# Patient Record
Sex: MALE | Race: WHITE | NOT HISPANIC OR LATINO | Employment: OTHER | URBAN - METROPOLITAN AREA
[De-identification: names, ages, dates, MRNs, and addresses within clinical notes are randomized per-mention and may not be internally consistent; named-entity substitution may affect disease eponyms.]

---

## 2023-08-24 PROBLEM — L89.90 ULCER, PRESSURE: Status: ACTIVE | Noted: 2023-08-24

## 2023-08-24 PROBLEM — I69.320 APHASIA FOLLOWING CEREBRAL INFARCTION: Status: ACTIVE | Noted: 2023-08-24

## 2023-08-24 PROBLEM — E55.9 VITAMIN D DEFICIENCY: Status: ACTIVE | Noted: 2023-08-24

## 2023-08-24 PROBLEM — R47.01 EXPRESSIVE APHASIA: Status: ACTIVE | Noted: 2023-08-24

## 2023-08-24 PROBLEM — Z96.642 HISTORY OF LEFT HIP REPLACEMENT: Status: ACTIVE | Noted: 2023-08-24

## 2023-08-24 PROBLEM — G40.909 NONINTRACTABLE EPILEPSY WITHOUT STATUS EPILEPTICUS (MULTI): Status: ACTIVE | Noted: 2023-08-24

## 2023-08-24 PROBLEM — I48.0 PAROXYSMAL ATRIAL FIBRILLATION (MULTI): Status: ACTIVE | Noted: 2023-08-24

## 2023-08-24 PROBLEM — I13.10 HYPERTENSIVE HEART AND CHRONIC KIDNEY DISEASE WITHOUT HEART FAILURE, WITH STAGE 1 THROUGH STAGE 4 CHRONIC KIDNEY DISEASE, OR UNSPECIFIED CHRONIC KIDNEY DISEASE: Status: ACTIVE | Noted: 2023-08-24

## 2023-08-24 PROBLEM — N18.32 STAGE 3B CHRONIC KIDNEY DISEASE (MULTI): Status: ACTIVE | Noted: 2023-08-24

## 2023-08-24 PROBLEM — I65.23 BILATERAL CAROTID ARTERY STENOSIS: Status: ACTIVE | Noted: 2023-08-24

## 2023-08-24 PROBLEM — G81.01: Status: ACTIVE | Noted: 2023-08-24

## 2023-08-24 PROBLEM — Z86.2 HISTORY OF ANEMIA: Status: ACTIVE | Noted: 2023-08-24

## 2023-08-24 PROBLEM — E78.00 PURE HYPERCHOLESTEROLEMIA: Status: ACTIVE | Noted: 2023-08-24

## 2023-08-24 PROBLEM — R13.10 DYSPHAGIA: Status: ACTIVE | Noted: 2023-08-24

## 2023-08-24 PROBLEM — I63.9: Status: ACTIVE | Noted: 2023-08-24

## 2023-08-24 PROBLEM — M25.559 PAIN IN JOINT INVOLVING PELVIC REGION AND THIGH: Status: ACTIVE | Noted: 2023-08-24

## 2023-08-24 PROBLEM — R56.9 SEIZURE (MULTI): Status: ACTIVE | Noted: 2023-08-24

## 2023-08-24 PROBLEM — E78.1 HYPERTRIGLYCERIDEMIA: Status: ACTIVE | Noted: 2023-08-24

## 2023-08-24 PROBLEM — E03.9 HYPOTHYROIDISM: Status: ACTIVE | Noted: 2023-08-24

## 2023-08-24 PROBLEM — R26.9 GAIT ABNORMALITY: Status: ACTIVE | Noted: 2023-08-24

## 2023-08-24 PROBLEM — I10 ESSENTIAL HYPERTENSION: Status: ACTIVE | Noted: 2023-08-24

## 2023-08-24 PROBLEM — M25.559 HIP PAIN: Status: ACTIVE | Noted: 2023-08-24

## 2023-08-24 PROBLEM — N18.2 CHRONIC KIDNEY DISEASE, STAGE 2 (MILD): Status: ACTIVE | Noted: 2023-08-24

## 2023-08-24 PROBLEM — S72.92XA CLOSED FRACTURE OF LEFT FEMUR (MULTI): Status: ACTIVE | Noted: 2023-08-24

## 2023-08-24 PROBLEM — C44.90 SKIN CANCER: Status: ACTIVE | Noted: 2023-08-24

## 2023-08-24 PROBLEM — I69.320 COMBINED RECEPTIVE AND EXPRESSIVE APHASIA DUE TO OLD STROKE: Status: ACTIVE | Noted: 2023-08-24

## 2023-08-24 PROBLEM — I69.359: Status: ACTIVE | Noted: 2019-01-01

## 2023-08-24 RX ORDER — NYSTATIN 100000 [USP'U]/G
POWDER TOPICAL
COMMUNITY
Start: 2023-03-13

## 2023-08-24 RX ORDER — AMLODIPINE BESYLATE 5 MG/1
TABLET ORAL
COMMUNITY
End: 2023-10-18 | Stop reason: WASHOUT

## 2023-08-24 RX ORDER — LEVETIRACETAM 500 MG/1
TABLET ORAL
COMMUNITY

## 2023-08-24 RX ORDER — POLYETHYLENE GLYCOL 3350 17 G/17G
POWDER, FOR SOLUTION ORAL
COMMUNITY

## 2023-08-24 RX ORDER — ASPIRIN 325 MG
TABLET ORAL
COMMUNITY

## 2023-08-24 RX ORDER — AMLODIPINE BESYLATE 2.5 MG/1
TABLET ORAL
COMMUNITY
End: 2023-10-18 | Stop reason: WASHOUT

## 2023-08-24 RX ORDER — MULTIVIT-MIN/FOLIC ACID/LUTEIN 400-250MCG
TABLET,CHEWABLE ORAL
COMMUNITY
End: 2023-10-18 | Stop reason: WASHOUT

## 2023-08-24 RX ORDER — SENNOSIDES 8.6 MG/1
TABLET ORAL
COMMUNITY
End: 2023-10-18 | Stop reason: WASHOUT

## 2023-08-24 RX ORDER — ATORVASTATIN CALCIUM 40 MG/1
TABLET, FILM COATED ORAL
COMMUNITY

## 2023-08-24 RX ORDER — DOCUSATE SODIUM 100 MG/1
CAPSULE, LIQUID FILLED ORAL
COMMUNITY

## 2023-08-24 RX ORDER — METOPROLOL SUCCINATE 50 MG/1
TABLET, EXTENDED RELEASE ORAL
COMMUNITY

## 2023-09-20 ENCOUNTER — HOSPITAL ENCOUNTER (OUTPATIENT)
Dept: DATA CONVERSION | Facility: HOSPITAL | Age: 78
Discharge: HOME | End: 2023-09-20
Payer: MEDICARE

## 2023-09-20 DIAGNOSIS — I63.512 CEREBRAL INFARCTION DUE TO UNSPECIFIED OCCLUSION OR STENOSIS OF LEFT MIDDLE CEREBRAL ARTERY (MULTI): ICD-10-CM

## 2023-09-20 DIAGNOSIS — R53.83 OTHER FATIGUE: ICD-10-CM

## 2023-09-20 DIAGNOSIS — R20.2 PARESTHESIA OF SKIN: ICD-10-CM

## 2023-09-20 DIAGNOSIS — M21.371 FOOT DROP, RIGHT FOOT: ICD-10-CM

## 2023-09-20 LAB
ALBUMIN SERPL-MCNC: 4.3 GM/DL (ref 3.5–5)
ALBUMIN/GLOB SERPL: 1.4 RATIO (ref 1.5–3)
ALP BLD-CCNC: 117 U/L (ref 35–125)
ALT SERPL-CCNC: 23 U/L (ref 5–40)
ANION GAP SERPL CALCULATED.3IONS-SCNC: 12 MMOL/L (ref 0–19)
AST SERPL-CCNC: 23 U/L (ref 5–40)
BASOPHILS # BLD AUTO: 0.11 K/UL (ref 0–0.22)
BASOPHILS NFR BLD AUTO: 1.5 % (ref 0–1)
BILIRUB SERPL-MCNC: 0.6 MG/DL (ref 0.1–1.2)
BUN SERPL-MCNC: 25 MG/DL (ref 8–25)
BUN/CREAT SERPL: 12.5 RATIO (ref 8–21)
CALCIUM SERPL-MCNC: 9.6 MG/DL (ref 8.5–10.4)
CHLORIDE SERPL-SCNC: 104 MMOL/L (ref 97–107)
CO2 SERPL-SCNC: 25 MMOL/L (ref 24–31)
CREAT SERPL-MCNC: 2 MG/DL (ref 0.4–1.6)
CRP SERPL-MCNC: 0.4 MG/DL (ref 0–2)
DEPRECATED RDW RBC AUTO: 45.1 FL (ref 37–54)
DIFFERENTIAL METHOD BLD: ABNORMAL
EOSINOPHIL # BLD AUTO: 0.46 K/UL (ref 0–0.45)
EOSINOPHIL NFR BLD: 6.3 % (ref 0–3)
ERYTHROCYTE [DISTWIDTH] IN BLOOD BY AUTOMATED COUNT: 13.2 % (ref 11.7–15)
ERYTHROCYTE [SEDIMENTATION RATE] IN BLOOD BY WESTERGREN METHOD: 16 MM/HR (ref 0–20)
GFR SERPL CREATININE-BSD FRML MDRD: 33 ML/MIN/1.73 M2
GLOBULIN SER-MCNC: 3.1 G/DL (ref 1.9–3.7)
GLUCOSE SERPL-MCNC: 104 MG/DL (ref 65–99)
HCT VFR BLD AUTO: 42.1 % (ref 41–50)
HGB BLD-MCNC: 13.9 GM/DL (ref 13.5–16.5)
IMM GRANULOCYTES # BLD AUTO: 0.03 K/UL (ref 0–0.1)
LEVETIRACETAM SERPL-MCNC: NORMAL UG/ML
LYMPHOCYTES # BLD AUTO: 1.73 K/UL (ref 1.2–3.2)
LYMPHOCYTES NFR BLD MANUAL: 23.8 % (ref 20–40)
MCH RBC QN AUTO: 30.5 PG (ref 26–34)
MCHC RBC AUTO-ENTMCNC: 33 % (ref 31–37)
MCV RBC AUTO: 92.5 FL (ref 80–100)
MONOCYTES # BLD AUTO: 0.57 K/UL (ref 0–0.8)
MONOCYTES NFR BLD MANUAL: 7.9 % (ref 0–8)
NEUTROPHILS # BLD AUTO: 4.36 K/UL
NEUTROPHILS # BLD AUTO: 4.36 K/UL (ref 1.8–7.7)
NEUTROPHILS.IMMATURE NFR BLD: 0.4 % (ref 0–1)
NEUTS SEG NFR BLD: 60.1 % (ref 50–70)
NRBC BLD-RTO: 0 /100 WBC
PLATELET # BLD AUTO: 273 K/UL (ref 150–450)
PMV BLD AUTO: 10.6 CU (ref 7–12.6)
POTASSIUM SERPL-SCNC: 5.1 MMOL/L (ref 3.4–5.1)
PROT SERPL-MCNC: 7.4 G/DL (ref 5.9–7.9)
RBC # BLD AUTO: 4.55 M/UL (ref 4.5–5.5)
SODIUM SERPL-SCNC: 141 MMOL/L (ref 133–145)
T4 FREE SERPL-MCNC: 1.2 NG/DL (ref 0.9–1.7)
TSH SERPL DL<=0.05 MIU/L-ACNC: 6.02 MIU/L (ref 0.27–4.2)
VIT B12 SERPL-MCNC: 739 PG/ML (ref 211–946)
WBC # BLD AUTO: 7.3 K/UL (ref 4.5–11)

## 2023-10-03 ENCOUNTER — TELEPHONE (OUTPATIENT)
Dept: PRIMARY CARE | Facility: CLINIC | Age: 78
End: 2023-10-03
Payer: MEDICARE

## 2023-10-13 ENCOUNTER — LAB (OUTPATIENT)
Dept: LAB | Facility: LAB | Age: 78
End: 2023-10-13
Payer: MEDICARE

## 2023-10-13 DIAGNOSIS — R73.09 OTHER ABNORMAL GLUCOSE: ICD-10-CM

## 2023-10-13 DIAGNOSIS — I10 ESSENTIAL (PRIMARY) HYPERTENSION: Primary | ICD-10-CM

## 2023-10-13 LAB
ALBUMIN SERPL-MCNC: 4.4 G/DL (ref 3.5–5)
ANION GAP SERPL CALC-SCNC: 16 MMOL/L
BUN SERPL-MCNC: 24 MG/DL (ref 8–25)
CALCIUM SERPL-MCNC: 9.5 MG/DL (ref 8.5–10.4)
CHLORIDE SERPL-SCNC: 102 MMOL/L (ref 97–107)
CO2 SERPL-SCNC: 21 MMOL/L (ref 24–31)
CREAT SERPL-MCNC: 2.2 MG/DL (ref 0.4–1.6)
EST. AVERAGE GLUCOSE BLD GHB EST-MCNC: 143 MG/DL
GFR SERPL CREATININE-BSD FRML MDRD: 30 ML/MIN/1.73M*2
GLUCOSE SERPL-MCNC: 94 MG/DL (ref 65–99)
HBA1C MFR BLD: 6.6 %
PHOSPHATE SERPL-MCNC: 3.2 MG/DL (ref 2.5–4.5)
POTASSIUM SERPL-SCNC: 4.8 MMOL/L (ref 3.4–5.1)
SODIUM SERPL-SCNC: 139 MMOL/L (ref 133–145)

## 2023-10-13 PROCEDURE — 83036 HEMOGLOBIN GLYCOSYLATED A1C: CPT

## 2023-10-13 PROCEDURE — 80069 RENAL FUNCTION PANEL: CPT

## 2023-10-13 PROCEDURE — 36415 COLL VENOUS BLD VENIPUNCTURE: CPT

## 2023-10-17 PROBLEM — I63.9: Status: ACTIVE | Noted: 2019-01-16

## 2023-10-17 RX ORDER — EPINEPHRINE 0.3 MG/.3ML
0.3 INJECTION SUBCUTANEOUS
COMMUNITY
Start: 2017-09-26

## 2023-10-17 RX ORDER — SYRINGE,SAFETY WITH NEEDLE,1ML 25GX1"
SYRINGE (EA) MISCELLANEOUS
COMMUNITY
Start: 2019-03-18 | End: 2023-10-18 | Stop reason: WASHOUT

## 2023-10-17 RX ORDER — POLYVINYL ALCOHOL 14 MG/ML
1 SOLUTION/ DROPS OPHTHALMIC
COMMUNITY
Start: 2019-02-05 | End: 2023-10-18 | Stop reason: WASHOUT

## 2023-10-17 RX ORDER — LOSARTAN POTASSIUM AND HYDROCHLOROTHIAZIDE 12.5; 5 MG/1; MG/1
1 TABLET ORAL
COMMUNITY
Start: 2018-09-27 | End: 2023-10-18 | Stop reason: WASHOUT

## 2023-10-17 RX ORDER — MULTIVITAMIN
1 TABLET ORAL
COMMUNITY
Start: 2015-01-23

## 2023-10-17 RX ORDER — MUPIROCIN 20 MG/G
OINTMENT TOPICAL 2 TIMES DAILY
COMMUNITY
Start: 2023-01-04 | End: 2023-10-18 | Stop reason: WASHOUT

## 2023-10-18 ENCOUNTER — OFFICE VISIT (OUTPATIENT)
Dept: PRIMARY CARE | Facility: CLINIC | Age: 78
End: 2023-10-18
Payer: MEDICARE

## 2023-10-18 VITALS
TEMPERATURE: 97.6 F | BODY MASS INDEX: 28.49 KG/M2 | HEIGHT: 68 IN | SYSTOLIC BLOOD PRESSURE: 139 MMHG | OXYGEN SATURATION: 97 % | WEIGHT: 188 LBS | HEART RATE: 74 BPM | DIASTOLIC BLOOD PRESSURE: 76 MMHG

## 2023-10-18 DIAGNOSIS — Z00.00 ENCOUNTER FOR MEDICARE ANNUAL WELLNESS EXAM: Primary | ICD-10-CM

## 2023-10-18 DIAGNOSIS — N18.4 CHRONIC RENAL DISEASE, STAGE IV (MULTI): ICD-10-CM

## 2023-10-18 DIAGNOSIS — I48.0 PAROXYSMAL ATRIAL FIBRILLATION (MULTI): ICD-10-CM

## 2023-10-18 DIAGNOSIS — I69.359 HEMIPLEGIA AFFECTING DOMINANT SIDE, POST-STROKE (MULTI): ICD-10-CM

## 2023-10-18 DIAGNOSIS — L50.9 URTICARIA: ICD-10-CM

## 2023-10-18 DIAGNOSIS — E11.9 DIABETES MELLITUS WITHOUT COMPLICATION (MULTI): ICD-10-CM

## 2023-10-18 PROCEDURE — 1159F MED LIST DOCD IN RCRD: CPT | Performed by: INTERNAL MEDICINE

## 2023-10-18 PROCEDURE — G0439 PPPS, SUBSEQ VISIT: HCPCS | Performed by: INTERNAL MEDICINE

## 2023-10-18 PROCEDURE — 1126F AMNT PAIN NOTED NONE PRSNT: CPT | Performed by: INTERNAL MEDICINE

## 2023-10-18 PROCEDURE — 1036F TOBACCO NON-USER: CPT | Performed by: INTERNAL MEDICINE

## 2023-10-18 PROCEDURE — 3078F DIAST BP <80 MM HG: CPT | Performed by: INTERNAL MEDICINE

## 2023-10-18 PROCEDURE — 3075F SYST BP GE 130 - 139MM HG: CPT | Performed by: INTERNAL MEDICINE

## 2023-10-18 RX ORDER — METHYLPREDNISOLONE 4 MG/1
TABLET ORAL
Qty: 21 TABLET | Refills: 0 | Status: SHIPPED | OUTPATIENT
Start: 2023-10-18 | End: 2023-10-25

## 2023-10-18 ASSESSMENT — PATIENT HEALTH QUESTIONNAIRE - PHQ9
1. LITTLE INTEREST OR PLEASURE IN DOING THINGS: NOT AT ALL
SUM OF ALL RESPONSES TO PHQ9 QUESTIONS 1 AND 2: 0
2. FEELING DOWN, DEPRESSED OR HOPELESS: NOT AT ALL

## 2023-10-18 ASSESSMENT — ENCOUNTER SYMPTOMS
LOSS OF SENSATION IN FEET: 1
OCCASIONAL FEELINGS OF UNSTEADINESS: 1
DEPRESSION: 0

## 2023-10-18 ASSESSMENT — PAIN SCALES - GENERAL: PAINLEVEL: 0-NO PAIN

## 2023-10-18 NOTE — PROGRESS NOTES
[facility]: [facility]  MEDICARE WELLNESS EXAM      Cooper Terry is a 78 y.o. male that is presenting today for Annual Exam (MEDICARE PHYSICAL).    Assessment/Plan    Diagnoses and all orders for this visit:  Encounter for Medicare annual wellness exam       Comments:            Reviewed Labs             COVID: UTD , Tdap: Declined, Flu: OD ( wants to get in his Pharmacy)    Shingrex: UTD, PNV UTD             Colonoscopy OD Refusing due to his hemiplegia              PSA UTD             PAP N/A             DEXA scan  N/A             Mammogram N/A  Chronic renal disease, stage IV (CMS/HCC)    Per most recent BW well uncontrolled got worse, has not been following with Nephrology after the initial visit.    Discussed hydration and avoiding nephrotoxic meds        Stressed out the importance of following up with Nephro      - Renal function panel; Future  Diabetes mellitus without complication (CMS/HCC)     Diet controlled, per recent BW        Continue same for now  -     Hemoglobin A1c; Future  Hemiplegia affecting dominant side, post-stroke (CMS/HCC)       Good improvement with PT   Paroxysmal atrial fibrillation (CMS/HCC)     Currently in SR      Continue long term anticoagulation   -     apixaban (Eliquis) 2.5 mg tablet; Take 1 tablet (2.5 mg) by mouth 2 times a day.  Urticaria       - Localized in Rt.arm and spreading distally to the Rt.hand  -     methylPREDNISolone (Medrol Dospak) 4 mg tablets; Take as directed on package.  ADVANCED CARE PLANNING  Advanced Care Planning was discussed with patient:  The patient has an active advanced care plan on file. The patient has an active surrogate decision-maker on file.  The patient was encouraged to ensure that any/all documentation is accurate and up to date, and that our office be provided a copy in the event that anything changes.    ACTIVITIES OF DAILY LIVING  Basic ADLs:  Bathing: Requires Assistance, Dressing: Requires Assistance,  Toileting: Requires Assistance, Transferring: Requires Assistance, Continence: Independent, Feeding: Independent.    Instrumental ADLs:  Ability to use phone: Requires Assistance, Shopping: Completely Dependent, Cooking: Completely Dependent, House-keeping: Completely Dependent, Laundry: Requires Assistance, Transportation: Completely Dependent, Medication Management: Completely Dependent, Finance Management: Completely Dependent.    Subjective   Patient is here with his wife for his wellness and been doing fairly well till 4 days ago where he developed red rash over his left arm not itchy or painful - denies       Review of Systems  All pertinent POSITIVES as noted per HPI.  All other systems have been reviewed and are NEGATIVE and /or Noncontributory to this patient current visit or complaint.    Objective   Vitals:    10/18/23 1506   BP: 139/76   Pulse: 74   Temp: 36.4 °C (97.6 °F)   SpO2: 97%      Body mass index is 28.59 kg/m².  Physical Exam  Vitals and nursing note reviewed.   Constitutional:       Appearance: Normal appearance.   HENT:      Head: Normocephalic and atraumatic.      Right Ear: Tympanic membrane, ear canal and external ear normal.      Left Ear: Tympanic membrane, ear canal and external ear normal.      Nose: Nose normal.      Mouth/Throat:      Mouth: Mucous membranes are moist.      Pharynx: Oropharynx is clear.   Eyes:      Extraocular Movements: Extraocular movements intact.      Conjunctiva/sclera: Conjunctivae normal.      Pupils: Pupils are equal, round, and reactive to light.   Neck:      Vascular: No carotid bruit.   Cardiovascular:      Rate and Rhythm: Normal rate and regular rhythm.      Pulses: Normal pulses.      Heart sounds: Normal heart sounds.   Pulmonary:      Effort: Pulmonary effort is normal.      Breath sounds: Normal breath sounds.   Abdominal:      General: Abdomen is flat. Bowel sounds are normal.      Palpations: Abdomen is soft.   Musculoskeletal:         General: No  "swelling. Normal range of motion.      Cervical back: Normal range of motion and neck supple.   Lymphadenopathy:      Cervical: No cervical adenopathy.   Skin:     General: Skin is warm and dry.   Neurological:      General: No focal deficit present.      Mental Status: He is alert and oriented to person, place, and time. Mental status is at baseline.   Psychiatric:         Mood and Affect: Mood normal.         Behavior: Behavior normal.       Diagnostic Results   Lab Results   Component Value Date    GLUCOSE 94 10/13/2023    CALCIUM 9.5 10/13/2023     10/13/2023    K 4.8 10/13/2023    CO2 21 (L) 10/13/2023     10/13/2023    BUN 24 10/13/2023    CREATININE 2.20 (H) 10/13/2023     Lab Results   Component Value Date    ALT 23 09/20/2023    AST 23 09/20/2023    ALKPHOS 117 09/20/2023    BILITOT 0.6 09/20/2023     Lab Results   Component Value Date    WBC 7.3 09/20/2023    HGB 13.9 09/20/2023    HCT 42.1 09/20/2023    MCV 92.5 09/20/2023     09/20/2023     Lab Results   Component Value Date    CHOL 125 (L) 07/13/2023    CHOL 118 (L) 05/20/2022    CHOL 118 (L) 05/05/2021     Lab Results   Component Value Date    HDL 29 (L) 07/13/2023    HDL 29 (L) 05/20/2022    HDL 28 (L) 05/05/2021     Lab Results   Component Value Date    LDLCALC 50 (L) 07/13/2023    LDLCALC 56 (L) 05/20/2022    LDLCALC 37 (L) 05/05/2021     Lab Results   Component Value Date    TRIG 229 (H) 07/13/2023    TRIG 164 (H) 05/20/2022    TRIG 143 09/09/2021     No components found for: \"CHOLHDL\"  Lab Results   Component Value Date    HGBA1C 6.6 (H) 10/13/2023     Other labs not included in the list above reviewed either before or during this encounter.    History   History reviewed. No pertinent past medical history.  Past Surgical History:   Procedure Laterality Date    MR HEAD ANGIO WO IV CONTRAST  1/11/2019    MR HEAD ANGIO WO IV CONTRAST [facility]     Family History   Problem Relation Name Age of Onset    Glaucoma Mother   "    Aneurysm Mother      Parkinsonism Father      Diabetes Sister       Social History     Socioeconomic History    Marital status:      Spouse name: Not on file    Number of children: Not on file    Years of education: Not on file    Highest education level: Not on file   Occupational History    Not on file   Tobacco Use    Smoking status: Never    Smokeless tobacco: Never   Substance and Sexual Activity    Alcohol use: Never    Drug use: Never    Sexual activity: Not on file   Other Topics Concern    Not on file   Social History Narrative    Not on file     Social Determinants of Health     Financial Resource Strain: Not on file   Food Insecurity: Not on file   Transportation Needs: Not on file   Physical Activity: Not on file   Stress: Not on file   Social Connections: Not on file   Intimate Partner Violence: Not on file   Housing Stability: Not on file     Allergies   Allergen Reactions    Venom-Wasp Swelling    Wasp Venom Swelling     Current Outpatient Medications on File Prior to Visit   Medication Sig Dispense Refill    amlodipine besylate (AMLODIPINE ORAL) Take 10 mg by mouth once daily in the morning.      aspirin 325 mg tablet 1 tablet Orally Once a day      atorvastatin (Lipitor) 40 mg tablet 1 tablet Orally Once a day for 90 days      docusate sodium (Colace) 100 mg capsule 1 capsule as needed Orally Once a day      EPINEPHrine 0.3 mg/0.3 mL injection syringe Inject 0.3 mL (0.3 mg) into the muscle.      levETIRAcetam (Keppra) 500 mg tablet 1 tablet Oral Twice a day      metoprolol succinate XL (Toprol-XL) 50 mg 24 hr tablet 1 tablet Orally Once a day for 90 days      multivitamin tablet Take 1 tablet by mouth once daily.      nystatin (Mycostatin) 100,000 unit/gram powder 1 application TO AFFECTED AREA Twice a day for 30 days      polyethylene glycol (Miralax) 17 gram/dose powder as directed Orally      [DISCONTINUED] apixaban (Eliquis) 5 mg tablet 1 tab(s) Orally twice a day      [DISCONTINUED]  "insulin syringe 29G X 1/2\" 1 mL syringe       [DISCONTINUED] losartan-hydrochlorothiazide (Hyzaar) 50-12.5 mg tablet Take 1 tablet by mouth once daily.      [DISCONTINUED] mupirocin (Bactroban) 2 % ointment Apply topically 2 times a day. to affected area      [DISCONTINUED] polyvinyl alcohol (Liquifilm Tears) 1.4 % ophthalmic solution Administer 1 drop into affected eye(s) once daily.      [DISCONTINUED] amLODIPine (Norvasc) 2.5 mg tablet 1 tablet Orally Once a day for 90 days      [DISCONTINUED] amLODIPine (Norvasc) 5 mg tablet 1 tablet daily Orally Once a day for 90 days      [DISCONTINUED] docusate sodium (Colace) 50 mg capsule 1 capsule oral daily every evening      [DISCONTINUED] mv-min-folic acid-lutein (Centrum Silver) 400-250 mcg tablet,chewable 1 tablet oral daily every morning      [DISCONTINUED] sennosides (Senokot) 8.6 mg tablet 1 tablet oral daily at bedtime       No current facility-administered medications on file prior to visit.     Immunization History   Administered Date(s) Administered    Flu vaccine, quadrivalent, high-dose, preservative free, age 65y+ (FLUZONE) 11/05/2020, 11/02/2021, 10/20/2022    Influenza, High Dose Seasonal, Preservative Free 10/09/2019, 10/15/2019    Influenza, seasonal, injectable 10/18/2015    Influenza, seasonal, injectable, preservative free 10/04/2013    Influenza, trivalent, adjuvanted 10/15/2018    Moderna COVID-19 vaccine, bivalent, blue cap/gray label *Check age/dose* 10/20/2022    Moderna SARS-CoV-2 Vaccination 03/12/2021, 04/09/2021, 06/01/2022    Pneumococcal conjugate vaccine, 13-valent (PREVNAR 13) 01/23/2015, 05/10/2016    Pneumococcal polysaccharide vaccine, 23-valent, age 2 years and older (PNEUMOVAX 23) 05/11/2018    Tdap vaccine, age 7 year and older (BOOSTRIX) 01/23/2015     Patient's medical history was reviewed and updated either before or during this encounter.    Ilana Ryan MD  "

## 2023-12-11 ENCOUNTER — TELEPHONE (OUTPATIENT)
Dept: PRIMARY CARE | Facility: CLINIC | Age: 78
End: 2023-12-11
Payer: MEDICARE

## 2023-12-11 NOTE — TELEPHONE ENCOUNTER
Pt c/o worsening cough, sore throat, chest hurts from cough x3 weeks.  Has only been taking ImmunePlus and has not tested for COVID.  Requesting further direction or Rx to [facility] [address] [place], [place] [id number] gary    Pt advised to take COVID test and call office back.

## 2023-12-18 ENCOUNTER — TELEPHONE (OUTPATIENT)
Dept: PRIMARY CARE | Facility: CLINIC | Age: 78
End: 2023-12-18
Payer: MEDICARE

## 2023-12-18 NOTE — TELEPHONE ENCOUNTER
Spouse states pt has not been feeling well.  Has sore throat, barky cough with no improvement in 2 weeks.  COVID test negative.  Asking if pt needs medication or appt.  Please advise.  Ph: [phone]    Marva [place] in [place] if Rx.

## 2023-12-20 ENCOUNTER — OFFICE VISIT (OUTPATIENT)
Dept: PRIMARY CARE | Facility: CLINIC | Age: 78
End: 2023-12-20
Payer: MEDICARE

## 2023-12-20 VITALS
HEART RATE: 86 BPM | SYSTOLIC BLOOD PRESSURE: 131 MMHG | TEMPERATURE: 97.8 F | DIASTOLIC BLOOD PRESSURE: 70 MMHG | OXYGEN SATURATION: 93 %

## 2023-12-20 DIAGNOSIS — J06.9 URI WITH COUGH AND CONGESTION: Primary | ICD-10-CM

## 2023-12-20 PROCEDURE — 99213 OFFICE O/P EST LOW 20 MIN: CPT | Performed by: INTERNAL MEDICINE

## 2023-12-20 PROCEDURE — 1159F MED LIST DOCD IN RCRD: CPT | Performed by: INTERNAL MEDICINE

## 2023-12-20 PROCEDURE — 3078F DIAST BP <80 MM HG: CPT | Performed by: INTERNAL MEDICINE

## 2023-12-20 PROCEDURE — 3075F SYST BP GE 130 - 139MM HG: CPT | Performed by: INTERNAL MEDICINE

## 2023-12-20 PROCEDURE — 1036F TOBACCO NON-USER: CPT | Performed by: INTERNAL MEDICINE

## 2023-12-20 PROCEDURE — 1125F AMNT PAIN NOTED PAIN PRSNT: CPT | Performed by: INTERNAL MEDICINE

## 2023-12-20 RX ORDER — BENZONATATE 200 MG/1
200 CAPSULE ORAL 3 TIMES DAILY PRN
Qty: 30 CAPSULE | Refills: 0 | Status: SHIPPED | OUTPATIENT
Start: 2023-12-20 | End: 2023-12-30

## 2023-12-20 ASSESSMENT — ENCOUNTER SYMPTOMS
FEVER: 0
DEPRESSION: 0
LOSS OF SENSATION IN FEET: 0
HEADACHES: 0
SHORTNESS OF BREATH: 0
WHEEZING: 0
CHILLS: 0
SORE THROAT: 1
COUGH: 1
RHINORRHEA: 0
OCCASIONAL FEELINGS OF UNSTEADINESS: 1
SWEATS: 0

## 2023-12-20 ASSESSMENT — PATIENT HEALTH QUESTIONNAIRE - PHQ9
SUM OF ALL RESPONSES TO PHQ9 QUESTIONS 1 AND 2: 0
2. FEELING DOWN, DEPRESSED OR HOPELESS: NOT AT ALL
1. LITTLE INTEREST OR PLEASURE IN DOING THINGS: NOT AT ALL

## 2023-12-20 ASSESSMENT — COPD QUESTIONNAIRES: COPD: 0

## 2023-12-20 ASSESSMENT — PAIN SCALES - GENERAL: PAINLEVEL: 3

## 2023-12-20 NOTE — PROGRESS NOTES
[facility]: [facility]  PROGRESS NOTE      Cooper Terry is a 78 y.o. male that is presenting today for Sore Throat (And barky cough, besonate of wifes being taken, ).    Assessment/Plan   Diagnoses and all orders for this visit:  URI with cough and congestion  -     benzonatate (Tessalon) 200 mg capsule; Take 1 capsule (200 mg) by mouth 3 times a day as needed for cough for up to 10 days. Do not crush or chew.  Subjective   Sore Throat   This is a new problem. The current episode started 1 to 4 weeks ago. The problem has been gradually worsening. Neither side of throat is experiencing more pain than the other. There has been no fever. The pain is at a severity of 5/10. Associated symptoms include coughing. Pertinent negatives include no headaches or shortness of breath. He has had no exposure to strep. He has tried nothing for the symptoms.   Cough  This is a new problem. The current episode started 1 to 4 weeks ago. The problem has been unchanged. The problem occurs every few minutes. The cough is Non-productive. Associated symptoms include a sore throat. Pertinent negatives include no chest pain, chills, fever, headaches, nasal congestion, rhinorrhea, shortness of breath, sweats or wheezing. Nothing aggravates the symptoms. Risk factors: None. He has tried OTC cough suppressant for the symptoms. The treatment provided no relief. There is no history of asthma, bronchiectasis, bronchitis, COPD, emphysema, environmental allergies or pneumonia. None     Review of Systems   Constitutional:  Negative for chills and fever.   HENT:  Positive for sore throat. Negative for rhinorrhea.    Respiratory:  Positive for cough. Negative for shortness of breath and wheezing.    Cardiovascular:  Negative for chest pain.   Allergic/Immunologic: Negative for environmental allergies.   Neurological:  Negative for headaches.      Objective   Vitals:    12/20/23 1614   BP: 131/70   Pulse: 86   Temp: 36.6  °C (97.8 °F)   SpO2: 93%      There is no height or weight on file to calculate BMI.  Physical Exam  Vitals (Patient-reported vitals.) and nursing note reviewed.   Constitutional:       Appearance: Normal appearance.      Comments: This is a virtual / telehealth encounter; unable to perform physical exam.   HENT:      Head: Normocephalic and atraumatic.      Right Ear: Tympanic membrane, ear canal and external ear normal.      Left Ear: Ear canal and external ear normal.      Nose: Congestion present. No rhinorrhea.      Mouth/Throat:      Mouth: Mucous membranes are moist.      Pharynx: Oropharynx is clear. No oropharyngeal exudate or posterior oropharyngeal erythema.   Eyes:      Extraocular Movements: Extraocular movements intact.      Conjunctiva/sclera: Conjunctivae normal.   Neck:      Vascular: No carotid bruit.   Cardiovascular:      Rate and Rhythm: Normal rate and regular rhythm.      Pulses: Normal pulses.      Heart sounds: Normal heart sounds.   Pulmonary:      Effort: Pulmonary effort is normal.      Breath sounds: Normal breath sounds. No wheezing or rhonchi.   Abdominal:      General: Abdomen is flat.      Palpations: Abdomen is soft.   Musculoskeletal:         General: No swelling. Normal range of motion.      Cervical back: Neck supple.   Lymphadenopathy:      Cervical: No cervical adenopathy.   Skin:     General: Skin is warm and dry.   Neurological:      Mental Status: He is alert.   Psychiatric:         Mood and Affect: Mood normal.       Diagnostic Results   Lab Results   Component Value Date    GLUCOSE 94 10/13/2023    CALCIUM 9.5 10/13/2023     10/13/2023    K 4.8 10/13/2023    CO2 21 (L) 10/13/2023     10/13/2023    BUN 24 10/13/2023    CREATININE 2.20 (H) 10/13/2023     Lab Results   Component Value Date    ALT 23 09/20/2023    AST 23 09/20/2023    ALKPHOS 117 09/20/2023    BILITOT 0.6 09/20/2023     Lab Results   Component Value Date    WBC 7.3 09/20/2023    HGB 13.9 09/20/2023  "   HCT 42.1 09/20/2023    MCV 92.5 09/20/2023     09/20/2023     Lab Results   Component Value Date    CHOL 125 (L) 07/13/2023    CHOL 118 (L) 05/20/2022    CHOL 118 (L) 05/05/2021     Lab Results   Component Value Date    HDL 29 (L) 07/13/2023    HDL 29 (L) 05/20/2022    HDL 28 (L) 05/05/2021     Lab Results   Component Value Date    LDLCALC 50 (L) 07/13/2023    LDLCALC 56 (L) 05/20/2022    LDLCALC 37 (L) 05/05/2021     Lab Results   Component Value Date    TRIG 229 (H) 07/13/2023    TRIG 164 (H) 05/20/2022    TRIG 143 09/09/2021     No components found for: \"CHOLHDL\"  Lab Results   Component Value Date    HGBA1C 6.6 (H) 10/13/2023     Other labs not included in the list above were reviewed either before or during this encounter.    History    History reviewed. No pertinent past medical history.  Past Surgical History:   Procedure Laterality Date    MR HEAD ANGIO WO IV CONTRAST  1/11/2019    MR HEAD ANGIO WO IV CONTRAST [facility]     Family History   Problem Relation Name Age of Onset    Glaucoma Mother      Aneurysm Mother      Parkinsonism Father      Diabetes Sister       Social History     Socioeconomic History    Marital status:      Spouse name: Not on file    Number of children: Not on file    Years of education: Not on file    Highest education level: Not on file   Occupational History    Not on file   Tobacco Use    Smoking status: Never     Passive exposure: Never    Smokeless tobacco: Never   Vaping Use    Vaping Use: Never used   Substance and Sexual Activity    Alcohol use: Never    Drug use: Never    Sexual activity: Not on file   Other Topics Concern    Not on file   Social History Narrative    Not on file     Social Determinants of Health     Financial Resource Strain: Not on file   Food Insecurity: Not on file   Transportation Needs: Not on file   Physical Activity: Not on file   Stress: Not on file   Social Connections: Not on file   Intimate Partner Violence: Not on file "   Housing Stability: Not on file     Allergies   Allergen Reactions    Venom-Wasp Swelling    Wasp Venom Swelling     Current Outpatient Medications on File Prior to Visit   Medication Sig Dispense Refill    amlodipine besylate (AMLODIPINE ORAL) Take 10 mg by mouth once daily in the morning.      apixaban (Eliquis) 2.5 mg tablet Take 1 tablet (2.5 mg) by mouth 2 times a day. 180 tablet 1    aspirin 325 mg tablet 1 tablet Orally Once a day      atorvastatin (Lipitor) 40 mg tablet 1 tablet Orally Once a day for 90 days      docusate sodium (Colace) 100 mg capsule 1 capsule as needed Orally Once a day      EPINEPHrine 0.3 mg/0.3 mL injection syringe Inject 0.3 mL (0.3 mg) into the muscle.      levETIRAcetam (Keppra) 500 mg tablet 1 tablet Oral Twice a day      metoprolol succinate XL (Toprol-XL) 50 mg 24 hr tablet 1 tablet Orally Once a day for 90 days      multivitamin tablet Take 1 tablet by mouth once daily.      nystatin (Mycostatin) 100,000 unit/gram powder 1 application TO AFFECTED AREA Twice a day for 30 days      polyethylene glycol (Miralax) 17 gram/dose powder as directed Orally       No current facility-administered medications on file prior to visit.     Immunization History   Administered Date(s) Administered    Flu vaccine, quadrivalent, high-dose, preservative free, age 65y+ (FLUZONE) 11/05/2020, 11/02/2021, 10/20/2022    Influenza, High Dose Seasonal, Preservative Free 10/09/2019, 10/15/2019    Influenza, seasonal, injectable 10/18/2015    Influenza, seasonal, injectable, preservative free 10/04/2013    Influenza, trivalent, adjuvanted 10/15/2018    Moderna COVID-19 vaccine, bivalent, blue cap/gray label *Check age/dose* 10/20/2022    Moderna SARS-CoV-2 Vaccination 03/12/2021, 04/09/2021, 06/01/2022    Pneumococcal conjugate vaccine, 13-valent (PREVNAR 13) 01/23/2015, 05/10/2016    Pneumococcal polysaccharide vaccine, 23-valent, age 2 years and older (PNEUMOVAX 23) 05/11/2018    Tdap vaccine, age 7 year  and older (BOOSTRIX) 01/23/2015     Patient's medical history was reviewed and updated either before or during this encounter.       Ilana Ryan MD

## 2024-01-12 ENCOUNTER — LAB (OUTPATIENT)
Dept: LAB | Facility: LAB | Age: 79
End: 2024-01-12
Payer: MEDICARE

## 2024-01-12 DIAGNOSIS — E11.9 DIABETES MELLITUS WITHOUT COMPLICATION (MULTI): ICD-10-CM

## 2024-01-12 DIAGNOSIS — N18.4 CHRONIC RENAL DISEASE, STAGE IV (MULTI): ICD-10-CM

## 2024-01-12 LAB
ALBUMIN SERPL-MCNC: 4.1 G/DL (ref 3.5–5)
ANION GAP SERPL CALC-SCNC: 14 MMOL/L
BUN SERPL-MCNC: 24 MG/DL (ref 8–25)
CALCIUM SERPL-MCNC: 9.2 MG/DL (ref 8.5–10.4)
CHLORIDE SERPL-SCNC: 100 MMOL/L (ref 97–107)
CO2 SERPL-SCNC: 23 MMOL/L (ref 24–31)
CREAT SERPL-MCNC: 2.1 MG/DL (ref 0.4–1.6)
EGFRCR SERPLBLD CKD-EPI 2021: 31 ML/MIN/1.73M*2
EST. AVERAGE GLUCOSE BLD GHB EST-MCNC: 131 MG/DL
GLUCOSE SERPL-MCNC: 146 MG/DL (ref 65–99)
HBA1C MFR BLD: 6.2 %
PHOSPHATE SERPL-MCNC: 3.5 MG/DL (ref 2.5–4.5)
POTASSIUM SERPL-SCNC: 4.6 MMOL/L (ref 3.4–5.1)
SODIUM SERPL-SCNC: 137 MMOL/L (ref 133–145)

## 2024-01-12 PROCEDURE — 83036 HEMOGLOBIN GLYCOSYLATED A1C: CPT

## 2024-01-12 PROCEDURE — 80069 RENAL FUNCTION PANEL: CPT

## 2024-01-12 PROCEDURE — 36415 COLL VENOUS BLD VENIPUNCTURE: CPT

## 2024-01-17 ENCOUNTER — LAB (OUTPATIENT)
Dept: LAB | Facility: LAB | Age: 79
End: 2024-01-17
Payer: MEDICARE

## 2024-01-17 ENCOUNTER — OFFICE VISIT (OUTPATIENT)
Dept: PRIMARY CARE | Facility: CLINIC | Age: 79
End: 2024-01-17
Payer: MEDICARE

## 2024-01-17 VITALS
HEART RATE: 69 BPM | OXYGEN SATURATION: 92 % | DIASTOLIC BLOOD PRESSURE: 70 MMHG | SYSTOLIC BLOOD PRESSURE: 129 MMHG | TEMPERATURE: 97.5 F

## 2024-01-17 DIAGNOSIS — E78.2 MIXED HYPERLIPIDEMIA: ICD-10-CM

## 2024-01-17 DIAGNOSIS — Z12.5 ENCOUNTER FOR PROSTATE CANCER SCREENING: ICD-10-CM

## 2024-01-17 DIAGNOSIS — L02.92 RECURRENT BOILS: ICD-10-CM

## 2024-01-17 DIAGNOSIS — R73.03 PREDIABETES: ICD-10-CM

## 2024-01-17 DIAGNOSIS — N18.4 CHRONIC RENAL DISEASE, STAGE IV (MULTI): ICD-10-CM

## 2024-01-17 DIAGNOSIS — I69.359 HEMIPLEGIA AFFECTING DOMINANT SIDE, POST-STROKE (MULTI): ICD-10-CM

## 2024-01-17 DIAGNOSIS — R79.89 ELEVATED TSH: ICD-10-CM

## 2024-01-17 DIAGNOSIS — Z86.2 HISTORY OF ANEMIA: ICD-10-CM

## 2024-01-17 DIAGNOSIS — R73.03 PRE-DIABETES: ICD-10-CM

## 2024-01-17 DIAGNOSIS — G40.909 NONINTRACTABLE EPILEPSY WITHOUT STATUS EPILEPTICUS, UNSPECIFIED EPILEPSY TYPE (MULTI): ICD-10-CM

## 2024-01-17 DIAGNOSIS — Z01.89 ENCOUNTER FOR ROUTINE LABORATORY TESTING: ICD-10-CM

## 2024-01-17 DIAGNOSIS — I48.0 PAROXYSMAL ATRIAL FIBRILLATION (MULTI): Primary | ICD-10-CM

## 2024-01-17 DIAGNOSIS — E55.9 VITAMIN D DEFICIENCY: ICD-10-CM

## 2024-01-17 PROBLEM — E11.9 DIABETES MELLITUS WITHOUT COMPLICATION (MULTI): Status: RESOLVED | Noted: 2023-10-18 | Resolved: 2024-01-17

## 2024-01-17 LAB
T4 FREE SERPL-MCNC: 1 NG/DL (ref 0.9–1.7)
TSH SERPL DL<=0.05 MIU/L-ACNC: 5.18 MIU/L (ref 0.27–4.2)

## 2024-01-17 PROCEDURE — 1036F TOBACCO NON-USER: CPT | Performed by: INTERNAL MEDICINE

## 2024-01-17 PROCEDURE — 1159F MED LIST DOCD IN RCRD: CPT | Performed by: INTERNAL MEDICINE

## 2024-01-17 PROCEDURE — 84439 ASSAY OF FREE THYROXINE: CPT

## 2024-01-17 PROCEDURE — 3074F SYST BP LT 130 MM HG: CPT | Performed by: INTERNAL MEDICINE

## 2024-01-17 PROCEDURE — 1125F AMNT PAIN NOTED PAIN PRSNT: CPT | Performed by: INTERNAL MEDICINE

## 2024-01-17 PROCEDURE — 99213 OFFICE O/P EST LOW 20 MIN: CPT | Performed by: INTERNAL MEDICINE

## 2024-01-17 PROCEDURE — 36415 COLL VENOUS BLD VENIPUNCTURE: CPT

## 2024-01-17 PROCEDURE — 3078F DIAST BP <80 MM HG: CPT | Performed by: INTERNAL MEDICINE

## 2024-01-17 PROCEDURE — 84443 ASSAY THYROID STIM HORMONE: CPT

## 2024-01-17 RX ORDER — MUPIROCIN CALCIUM 20 MG/G
CREAM TOPICAL 3 TIMES DAILY
Qty: 30 G | Refills: 1 | Status: SHIPPED | OUTPATIENT
Start: 2024-01-17 | End: 2024-01-27

## 2024-01-17 ASSESSMENT — PAIN SCALES - GENERAL: PAINLEVEL: 1

## 2024-01-17 ASSESSMENT — ENCOUNTER SYMPTOMS
DEPRESSION: 0
LOSS OF SENSATION IN FEET: 0
OCCASIONAL FEELINGS OF UNSTEADINESS: 0

## 2024-01-17 NOTE — PROGRESS NOTES
[facility]: [facility]  PROGRESS NOTE      Cooper Terry is a 78 y.o. male that is presenting today for Diabetes (3 mo fu/).    Assessment/Plan   Diagnoses and all orders for this visit:  Paroxysmal atrial fibrillation (CMS/HCC)  -     apixaban (Eliquis) 2.5 mg tablet; Take 1 tablet (2.5 mg) by mouth 2 times a day.  Elevated TSH        Slight elevation, recheck  -     TSH with reflex to Free T4 if abnormal; Future  Pre-diabetes      Low CH diet   -     Comprehensive Metabolic Panel; Future  -     Hemoglobin A1C; Future  Chronic renal disease, stage IV (CMS/[facility])     Monitor / Hydration / Nephro on board  Hemiplegia affecting dominant side, post-stroke (CMS/[facility])        Nonintractable epilepsy without status epilepticus, unspecified epilepsy type (CMS/[facility])  Prediabetes  -     TSH with reflex to Free T4 if abnormal; Future  -     Comprehensive Metabolic Panel; Future  Encounter for routine laboratory testing  -     Comprehensive Metabolic Panel; Future  -     CBC and Auto Differential; Future  -     Lipid Panel; Future  -     Hemoglobin A1C; Future  -     Vitamin D 25-Hydroxy,Total (for eval of Vitamin D levels); Future  -     Prostate Specific Antigen; Future  History of anemia  -     CBC and Auto Differential; Future  Vitamin D deficiency  -     Vitamin D 25-Hydroxy,Total (for eval of Vitamin D levels); Future  Encounter for prostate cancer screening  -     Prostate Specific Antigen; Future  Mixed hyperlipidemia  -     Comprehensive Metabolic Panel; Future  -     Lipid Panel; Future  Recurrent boils  -     mupirocin (Bactroban) 2 % cream; Apply topically 3 times a day for 10 days. apply to affected area  Other orders  -     Follow Up In Primary Care; Future   Subjective   - Patient is here today for  FUV and A1C        - Patient denies any X symptoms or concerns at this time.    - patient denies any adverse reactions to or concerns with his/her meds.    Review of Systems    All pertinent  POSITIVES as noted per HPI.  All other systems have been reviewed and are NEGATIVE and /or Noncontributory to this patient current visit or complaint.  Objective     Vitals:    01/17/24 1408   BP: 129/70   Pulse: 69   Temp: 36.4 °C (97.5 °F)   SpO2: 92%        There is no height or weight on file to calculate BMI.  Physical Exam  Vitals and nursing note reviewed.   Constitutional:       Appearance: Normal appearance.   HENT:      Head: Normocephalic and atraumatic.   Neck:      Vascular: No carotid bruit.   Cardiovascular:      Rate and Rhythm: Normal rate and regular rhythm.      Pulses: Normal pulses.      Heart sounds: Normal heart sounds.   Pulmonary:      Effort: Pulmonary effort is normal.      Breath sounds: Normal breath sounds.   Abdominal:      General: Abdomen is flat. Bowel sounds are normal.      Palpations: Abdomen is soft.   Musculoskeletal:         General: No swelling. Normal range of motion.      Cervical back: Neck supple.   Lymphadenopathy:      Cervical: No cervical adenopathy.   Skin:     General: Skin is warm and dry.   Neurological:      Mental Status: He is alert.   Psychiatric:         Mood and Affect: Mood normal.   Diagnostic Results   Lab Results   Component Value Date    GLUCOSE 146 (H) 01/12/2024    CALCIUM 9.2 01/12/2024     01/12/2024    K 4.6 01/12/2024    CO2 23 (L) 01/12/2024     01/12/2024    BUN 24 01/12/2024    CREATININE 2.10 (H) 01/12/2024     Lab Results   Component Value Date    ALT 23 09/20/2023    AST 23 09/20/2023    ALKPHOS 117 09/20/2023    BILITOT 0.6 09/20/2023     Lab Results   Component Value Date    WBC 7.3 09/20/2023    HGB 13.9 09/20/2023    HCT 42.1 09/20/2023    MCV 92.5 09/20/2023     09/20/2023     Lab Results   Component Value Date    CHOL 125 (L) 07/13/2023    CHOL 118 (L) 05/20/2022    CHOL 118 (L) 05/05/2021     Lab Results   Component Value Date    HDL 29 (L) 07/13/2023    HDL 29 (L) 05/20/2022    HDL 28 (L) 05/05/2021     Lab Results  "  Component Value Date    LDLCALC 50 (L) 07/13/2023    LDLCALC 56 (L) 05/20/2022    LDLCALC 37 (L) 05/05/2021     Lab Results   Component Value Date    TRIG 229 (H) 07/13/2023    TRIG 164 (H) 05/20/2022    TRIG 143 09/09/2021     No components found for: \"CHOLHDL\"  Lab Results   Component Value Date    HGBA1C 6.2 (H) 01/12/2024     Other labs not included in the list above were reviewed either before or during this encounter.    History    History reviewed. No pertinent past medical history.  Past Surgical History:   Procedure Laterality Date   • MR HEAD ANGIO WO IV CONTRAST  1/11/2019    MR HEAD ANGIO WO IV CONTRAST [facility]     Family History   Problem Relation Name Age of Onset   • Glaucoma Mother     • Aneurysm Mother     • Parkinsonism Father     • Diabetes Sister       Social History     Socioeconomic History   • Marital status:      Spouse name: Not on file   • Number of children: Not on file   • Years of education: Not on file   • Highest education level: Not on file   Occupational History   • Not on file   Tobacco Use   • Smoking status: Never     Passive exposure: Never   • Smokeless tobacco: Never   Vaping Use   • Vaping Use: Never used   Substance and Sexual Activity   • Alcohol use: Never   • Drug use: Never   • Sexual activity: Not on file   Other Topics Concern   • Not on file   Social History Narrative   • Not on file     Social Determinants of Health     Financial Resource Strain: Not on file   Food Insecurity: Not on file   Transportation Needs: Not on file   Physical Activity: Not on file   Stress: Not on file   Social Connections: Not on file   Intimate Partner Violence: Not on file   Housing Stability: Not on file     Allergies   Allergen Reactions   • Venom-Wasp Swelling   • Wasp Venom Swelling     Current Outpatient Medications on File Prior to Visit   Medication Sig Dispense Refill   • amlodipine besylate (AMLODIPINE ORAL) Take 10 mg by mouth once daily in the morning.   "   • aspirin 325 mg tablet 1 tablet Orally Once a day     • atorvastatin (Lipitor) 40 mg tablet 1 tablet Orally Once a day for 90 days     • docusate sodium (Colace) 100 mg capsule 1 capsule as needed Orally Once a day     • EPINEPHrine 0.3 mg/0.3 mL injection syringe Inject 0.3 mL (0.3 mg) into the muscle.     • levETIRAcetam (Keppra) 500 mg tablet 1 tablet Oral Twice a day     • metoprolol succinate XL (Toprol-XL) 50 mg 24 hr tablet 1 tablet Orally Once a day for 90 days     • multivitamin tablet Take 1 tablet by mouth once daily.     • nystatin (Mycostatin) 100,000 unit/gram powder 1 application TO AFFECTED AREA Twice a day for 30 days     • polyethylene glycol (Miralax) 17 gram/dose powder as directed Orally     • [DISCONTINUED] apixaban (Eliquis) 2.5 mg tablet Take 1 tablet (2.5 mg) by mouth 2 times a day. 180 tablet 1     No current facility-administered medications on file prior to visit.     Immunization History   Administered Date(s) Administered   • Flu vaccine, quadrivalent, high-dose, preservative free, age 65y+ (FLUZONE) 11/05/2020, 11/02/2021, 10/20/2022   • Influenza, High Dose Seasonal, Preservative Free 10/09/2019, 10/15/2019   • Influenza, seasonal, injectable 10/18/2015   • Influenza, seasonal, injectable, preservative free 10/04/2013   • Influenza, trivalent, adjuvanted 10/15/2018   • Moderna COVID-19 vaccine, bivalent, blue cap/gray label *Check age/dose* 10/20/2022   • Moderna SARS-CoV-2 Vaccination 03/12/2021, 04/09/2021, 06/01/2022   • Pneumococcal conjugate vaccine, 13-valent (PREVNAR 13) 01/23/2015, 05/10/2016   • Pneumococcal polysaccharide vaccine, 23-valent, age 2 years and older (PNEUMOVAX 23) 05/11/2018   • Tdap vaccine, age 7 year and older (BOOSTRIX) 01/23/2015     Patient's medical history was reviewed and updated either before or during this encounter.       Ilana Ryan MD

## 2024-02-16 ENCOUNTER — HOSPITAL ENCOUNTER (OUTPATIENT)
Dept: RADIOLOGY | Facility: EXTERNAL LOCATION | Age: 79
Discharge: HOME | End: 2024-02-16
Payer: MEDICARE

## 2024-02-16 DIAGNOSIS — R10.2 PELVIC PAIN: ICD-10-CM

## 2024-02-23 ENCOUNTER — APPOINTMENT (OUTPATIENT)
Dept: PRIMARY CARE | Facility: CLINIC | Age: 79
End: 2024-02-23
Payer: MEDICARE

## 2024-05-22 ENCOUNTER — APPOINTMENT (OUTPATIENT)
Dept: PRIMARY CARE | Facility: CLINIC | Age: 79
End: 2024-05-22
Payer: MEDICARE

## 2024-06-10 ENCOUNTER — APPOINTMENT (OUTPATIENT)
Dept: PRIMARY CARE | Facility: CLINIC | Age: 79
End: 2024-06-10
Payer: MEDICARE

## 2024-06-12 ENCOUNTER — APPOINTMENT (OUTPATIENT)
Dept: PRIMARY CARE | Facility: CLINIC | Age: 79
End: 2024-06-12
Payer: MEDICARE

## 2024-08-14 ENCOUNTER — HOSPITAL ENCOUNTER (OUTPATIENT)
Dept: RADIOLOGY | Facility: EXTERNAL LOCATION | Age: 79
Discharge: HOME | End: 2024-08-14

## 2024-08-14 DIAGNOSIS — M25.561 RIGHT KNEE PAIN, UNSPECIFIED CHRONICITY: ICD-10-CM

## 2024-09-10 ENCOUNTER — HOSPITAL ENCOUNTER (OUTPATIENT)
Dept: RADIOLOGY | Facility: EXTERNAL LOCATION | Age: 79
Discharge: HOME | End: 2024-09-10

## 2024-09-10 DIAGNOSIS — M25.571 RIGHT ANKLE PAIN, UNSPECIFIED CHRONICITY: ICD-10-CM

## 2024-10-15 ENCOUNTER — OFFICE VISIT (OUTPATIENT)
Dept: URGENT CARE | Age: 79
End: 2024-10-15
Payer: MEDICARE

## 2024-10-15 VITALS
TEMPERATURE: 97.7 F | RESPIRATION RATE: 16 BRPM | OXYGEN SATURATION: 97 % | HEART RATE: 75 BPM | SYSTOLIC BLOOD PRESSURE: 121 MMHG | HEIGHT: 68 IN | WEIGHT: 180 LBS | DIASTOLIC BLOOD PRESSURE: 59 MMHG | BODY MASS INDEX: 27.28 KG/M2

## 2024-10-15 DIAGNOSIS — G89.29 OTHER CHRONIC PAIN: Primary | ICD-10-CM

## 2024-10-15 PROCEDURE — 3074F SYST BP LT 130 MM HG: CPT | Performed by: PHYSICIAN ASSISTANT

## 2024-10-15 PROCEDURE — 99213 OFFICE O/P EST LOW 20 MIN: CPT | Performed by: PHYSICIAN ASSISTANT

## 2024-10-15 PROCEDURE — 3078F DIAST BP <80 MM HG: CPT | Performed by: PHYSICIAN ASSISTANT

## 2024-10-15 PROCEDURE — 1036F TOBACCO NON-USER: CPT | Performed by: PHYSICIAN ASSISTANT

## 2024-10-15 PROCEDURE — 1125F AMNT PAIN NOTED PAIN PRSNT: CPT | Performed by: PHYSICIAN ASSISTANT

## 2024-10-15 PROCEDURE — 1159F MED LIST DOCD IN RCRD: CPT | Performed by: PHYSICIAN ASSISTANT

## 2024-10-15 ASSESSMENT — PAIN SCALES - GENERAL: PAINLEVEL: 8

## 2024-10-15 NOTE — PROGRESS NOTES
Subjective   Patient ID: Cooper Terry is a 79 y.o. male. They present today with a chief complaint of multiple body part pain (Right knee and right ankle pain ( patient grabbed knee he was having trouble stating why he was here so I had to ask the wife the questions and she stated no new injury that this pain was from 9/9/24 when he was seen here already here for the injuries twice.) ).    History of Present Illness  Patient is a pleasant 79-year-old white male, past medical history of dementia, CVA on Eliquis, hypertension, hyperlipidemia, seizures, presenting to the clinic with daughter for chief complaint of right knee and right ankle pain.  Daughter at bedside is chief historian as patient does have underlying dementia.  She is reporting that he has been experiencing persistent chronic pain in the right knee and right ankle.  Was seen and evaluated about a month ago and had x-ray imaging performed which was negative.  She presented today out of concern for pain.  No new trauma or injury.  Denies any swelling or bruising.  Patient does have right-sided deficits at baseline due to his prior stroke.          Past Medical History  Allergies as of 10/15/2024 - Reviewed 10/15/2024   Allergen Reaction Noted    Venom-wasp Swelling 07/18/2013    Wasp venom Swelling 01/16/2019       (Not in a hospital admission)         History reviewed. No pertinent past medical history.    Past Surgical History:   Procedure Laterality Date    MR HEAD ANGIO WO IV CONTRAST  1/11/2019    MR HEAD ANGIO WO IV CONTRAST [facility]        reports that he has never smoked. He has never been exposed to tobacco smoke. He has never used smokeless tobacco. He reports that he does not drink alcohol and does not use drugs.    Review of Systems  Review of Systems               All review of systems negative unless stated in HPI.                 Objective    Vitals:    10/15/24 1829   BP: 121/59   BP Location: Left arm   Patient  "Position: Sitting   Pulse: 75   Resp: 16   Temp: 36.5 °C (97.7 °F)   TempSrc: Oral   SpO2: 97%   Weight: 81.6 kg (180 lb)   Height: 1.727 m (5' 8\")     No LMP for male patient.    Physical Exam  Constitutional: Alert, oriented, cooperative, in no acute distress. Appears well nourished and well hydrated.    Head: Normocephalic, atraumatic    Skin: Intact, dry skin. No lesions, rash, petechiae, or purpura.    Eyes: PERRL, EOMs intact, conjunctiva pink with no erythema or exudates. No scleral icterus. Eyelids without lesions.    ENT: Hearing grossly intact. No external deformities. Tympanic membranes intact with visible landmarks. Nares patent, mucus membranes moist. Dentition without lesions. Pharynx clear, uvula midline.    Neck: Trachea midline, no lymphadenopathy. No meningismus.     Pulmonary: Lungs clear to auscultation bilaterally with good chest wall excursion. No rales, rhonchi, or wheezing. No accessory muscle use or stridor.     Cardiac: Regular rate and rhythm. Normal S1 and S2 with no murmur, rub, or gallop. No JVD, carotids without bruits. 2+ DP and PT pulses.     Abdomen: Soft, nontender, normoactive bowel sounds. No palpable organomegaly or mass. No rebound or guarding. No CVA tenderness.     Genitourinary: Exam deferred.    Musculoskeletal: Evaluation of the right ankle and right knee are overall unremarkable.  There is no swelling or ecchymosis throughout.  There is no palpable tenderness.  Strength and range of motion is at his baseline.  Full range of motion in extremities. No pain, edema, or deformities. Peripheral pulses full and equal. No cyanosis, or clubbing.     Neurological: Cranial nerves II through XII are grossly intact. Normal sensation, no weakness, no focal findings identified.     Psychiatric: Appropriate mood and affect. Calm.  Procedures    Point of Care Test & Imaging Results from this visit    No results found.    Diagnostic study results (if any) were reviewed by Moises" ALESHA Chauhan.    Assessment/Plan   Allergies, medications, history, and pertinent labs/EKGs/Imaging reviewed by Moises Chauhan PA-C.     Medical Decision Making  Patient was seen eval in the clinic for chief complaint of chronic right knee and right ankle pain.  On exam patient is nontoxic well-appearing respite comfortably no acute distress.  Vital signs are stable, afebrile.  Chest clear heart is regular, belly soft and nontender.  Evaluation of right ankle and knee as above.  Overall unremarkable.  No new trauma or injury.  Do not feel necessity for x-ray imaging at this time.  Had a very extensive conversation with the patient daughter at bedside regarding the chronic nature of these complaints and that I feel needs to be addressed by his primary care physician as the only option I have at this time for his pain is Tylenol given his underlying chronic kidney disease and inability to use NSAIDs due to his CKD as well as anticoagulant use.  Patient likely needs to be seen and evaluated and managed by pain management.  I expressed this to the daughter and she understands and agrees.  Patient be discharged home at this time discharged to follow-up with her primary care physician as planned as well as with pain management.  I reviewed my impression, plan, strict return versus report to ED precautions with patient daughter at bedside.  Both expressed understanding and agreement plan of care.      Orders and Diagnoses  There are no diagnoses linked to this encounter.      Medical Admin Record      Follow Up Instructions  No follow-ups on file.    Patient disposition: Home    Electronically signed by Moises Chauhan PA-C  7:10 PM